# Patient Record
Sex: MALE | Race: WHITE
[De-identification: names, ages, dates, MRNs, and addresses within clinical notes are randomized per-mention and may not be internally consistent; named-entity substitution may affect disease eponyms.]

---

## 2020-05-19 ENCOUNTER — HOSPITAL ENCOUNTER (EMERGENCY)
Dept: HOSPITAL 72 - EMR | Age: 60
Discharge: HOME | End: 2020-05-19
Payer: MEDICAID

## 2020-05-19 VITALS — WEIGHT: 150 LBS | HEIGHT: 67 IN | BODY MASS INDEX: 23.54 KG/M2

## 2020-05-19 VITALS — DIASTOLIC BLOOD PRESSURE: 71 MMHG | SYSTOLIC BLOOD PRESSURE: 119 MMHG

## 2020-05-19 VITALS — SYSTOLIC BLOOD PRESSURE: 119 MMHG | DIASTOLIC BLOOD PRESSURE: 71 MMHG

## 2020-05-19 DIAGNOSIS — M25.571: ICD-10-CM

## 2020-05-19 DIAGNOSIS — M25.561: Primary | ICD-10-CM

## 2020-05-19 PROCEDURE — 73564 X-RAY EXAM KNEE 4 OR MORE: CPT

## 2020-05-19 PROCEDURE — 99283 EMERGENCY DEPT VISIT LOW MDM: CPT

## 2020-05-19 NOTE — DIAGNOSTIC IMAGING REPORT
EXAM:

  XR Right Knee, 3 Views

 

CLINICAL HISTORY:

  PAIN

 

TECHNIQUE:

  Three views of the right knee.

 

COMPARISON:

  No relevant prior studies available.

 

FINDINGS:

  Bones/joints:  No acute displaced fracture or dislocation.  No 

significant joint effusion.

  Soft tissues:  Unremarkable.

 

IMPRESSION:     

  No acute displaced fracture or dislocation.

## 2020-05-19 NOTE — EMERGENCY ROOM REPORT
History of Present Illness


General


Source:  Patient





Present Illness


HPI


Patient is a 60-year-old male brought in by [facility] after increased 

right-sided knee pain.  Patient had recent fall approximately 2 weeks ago.  He 

was sent in from [facility].  Apparently had previous x-ray 

imaging which did not show fracture.  Persistent pain to the knee.Patient had 

reported pain to the right knee above the kneecap.  Reports having some mild 

pain to the right ankle.


Allergies:  


Coded Allergies:  


     No Known Allergies (Unverified , 5/19/20)





Patient History


Past Medical History:  see triage record


Reviewed Nursing Documentation:  PMH: Agreed; PSxH: Agreed





Review of Systems


All Other Systems:  negative except mentioned in HPI





Physical Exam


Sp02 EP Interpretation:  reviewed, normal


General Appearance:  normal inspection, well appearing, no apparent distress, 

alert, GCS 15


Head:  atraumatic


ENT:  normal ENT inspection, hearing grossly normal, normal voice


Neck:  normal inspection, full range of motion, supple, no bony tend


Respiratory:  normal inspection, lungs clear, normal breath sounds, no 

respiratory distress, no retraction, no wheezing


Cardiovascular #1:  regular rate, rhythm, no edema


Gastrointestinal:  normal inspection, normal bowel sounds, non tender, soft, no 

guarding, no hernia


Genitourinary:  no CVA tenderness


Musculoskeletal:  normal inspection, back normal, other - soft tissue swelling 

to right knee


Neurologic:  alert, responsive, speech normal, normal inspection


Psychiatric:  normal inspection, judgement/insight normal, mood/affect normal





Medical Decision Making


Diagnostic Impression:  


 Primary Impression:  


 Right knee pain


ER Course


Patient presented for right knee pain.  Differential diagnosis include was not 

limited to fracture, contusion, knee sprain, arthritis among others.  X-ray 

imaging was ordered to patient's reported recent trauma.  Patient was noted to 

have some. Mild soft tissue swelling to the suprapatellar area as well as to 

the infrapatellar area.X-ray imaging showed no evidence of acute fracture.  

Patient will be sent back to his facility.Patient is given pain medications for 

his knee.  Further management per primary care physician.  No palpable cord or 

calf tenderness tenderness and normal pulses to the foot.


Status:  improved


Disposition:  HOME, SELF-CARE


Condition:  Stable











Kanu Macias MD May 19, 2020 17:58

## 2020-05-19 NOTE — NUR
ED Nurse Note:

Patient brought into ED from [facility] for right knee pain due to 
fall. patient sustained fall and got an xray done on 5/15/20 which did not show 
fracture. patient is alert awake able to express his needs, in no acute 
distress, no shortness of breath patient placed on a hospital gown.

## 2020-05-19 NOTE — NUR
ER DISCHARGE NOTE:

Patient is cleared to be discharged per ERMD, pt is aox4, on room air, with 
stable vital signs. pt was given dc and prescription instructions, pt was able 
to verbalize understanding, pt id band  removed without complications. pt is 
able to ambulate with steady gait. pt took all belongings. pt taken via 
ambulance, [id number], all belongings with pt.

## 2020-05-19 NOTE — NUR
SPOKE WITH NATI FROM [facility], INFORMED THAT THE PATIENT WILL 
BE TRANSFERRED BACK

## 2020-05-19 NOTE — NUR
ED Nurse Note:



Recieved report to resume care, pt in bed awake and alert, has been discharged 
and waiting for transport back to facility. nad noted, will continue to monitor 
while waiting for ambulance ETA.